# Patient Record
Sex: MALE | Race: WHITE | Employment: FULL TIME | URBAN - NONMETROPOLITAN AREA
[De-identification: names, ages, dates, MRNs, and addresses within clinical notes are randomized per-mention and may not be internally consistent; named-entity substitution may affect disease eponyms.]

---

## 2017-01-30 ENCOUNTER — TELEPHONE (OUTPATIENT)
Dept: PULMONOLOGY | Age: 57
End: 2017-01-30

## 2018-12-27 ENCOUNTER — HOSPITAL ENCOUNTER (OUTPATIENT)
Age: 58
Setting detail: SPECIMEN
Discharge: HOME | End: 2018-12-27
Payer: COMMERCIAL

## 2018-12-27 LAB
ALBUMIN SERPL-MCNC: 3.9 G/DL (ref 3.5–5.2)
ALBUMIN/GLOBULIN RATIO: 1.9 (ref 1–2.5)
ALP BLD-CCNC: 82 U/L (ref 40–129)
ALT SERPL-CCNC: 22 U/L (ref 5–41)
ANION GAP SERPL CALCULATED.3IONS-SCNC: 10 MMOL/L (ref 9–17)
AST SERPL-CCNC: 23 U/L
BILIRUB SERPL-MCNC: 0.35 MG/DL (ref 0.3–1.2)
BUN BLDV-MCNC: 21 MG/DL (ref 6–20)
BUN/CREAT BLD: ABNORMAL (ref 9–20)
CALCIUM SERPL-MCNC: 8.6 MG/DL (ref 8.6–10.4)
CHLORIDE BLD-SCNC: 101 MMOL/L (ref 98–107)
CHOLESTEROL/HDL RATIO: 3.3
CHOLESTEROL: 159 MG/DL
CO2: 26 MMOL/L (ref 20–31)
CREAT SERPL-MCNC: 0.94 MG/DL (ref 0.7–1.2)
GFR AFRICAN AMERICAN: >60 ML/MIN
GFR NON-AFRICAN AMERICAN: >60 ML/MIN
GFR SERPL CREATININE-BSD FRML MDRD: ABNORMAL ML/MIN/{1.73_M2}
GFR SERPL CREATININE-BSD FRML MDRD: ABNORMAL ML/MIN/{1.73_M2}
GLUCOSE BLD-MCNC: 78 MG/DL (ref 70–99)
HDLC SERPL-MCNC: 48 MG/DL
LDL CHOLESTEROL: 98 MG/DL (ref 0–130)
POTASSIUM SERPL-SCNC: 4.8 MMOL/L (ref 3.7–5.3)
SODIUM BLD-SCNC: 137 MMOL/L (ref 135–144)
TOTAL PROTEIN: 6 G/DL (ref 6.4–8.3)
TRIGL SERPL-MCNC: 65 MG/DL
TSH SERPL DL<=0.05 MIU/L-ACNC: 1.96 MIU/L (ref 0.3–5)
VLDLC SERPL CALC-MCNC: NORMAL MG/DL (ref 1–30)

## 2019-01-03 ENCOUNTER — HOSPITAL ENCOUNTER (OUTPATIENT)
Dept: INTERVENTIONAL RADIOLOGY/VASCULAR | Age: 59
Discharge: HOME | End: 2019-01-03
Payer: COMMERCIAL

## 2019-01-03 DIAGNOSIS — I73.9 PAD (PERIPHERAL ARTERY DISEASE) (HCC): ICD-10-CM

## 2019-01-03 PROCEDURE — 93922 UPR/L XTREMITY ART 2 LEVELS: CPT

## 2020-03-16 ENCOUNTER — APPOINTMENT (OUTPATIENT)
Dept: CT IMAGING | Age: 60
End: 2020-03-16

## 2020-03-16 ENCOUNTER — HOSPITAL ENCOUNTER (OUTPATIENT)
Age: 60
Setting detail: OBSERVATION
Discharge: HOME | End: 2020-03-17
Attending: INTERNAL MEDICINE | Admitting: INTERNAL MEDICINE
Payer: COMMERCIAL

## 2020-03-16 ENCOUNTER — APPOINTMENT (OUTPATIENT)
Dept: MRI IMAGING | Age: 60
End: 2020-03-16

## 2020-03-16 ENCOUNTER — APPOINTMENT (OUTPATIENT)
Dept: GENERAL RADIOLOGY | Age: 60
End: 2020-03-16

## 2020-03-16 ENCOUNTER — APPOINTMENT (OUTPATIENT)
Dept: ULTRASOUND IMAGING | Age: 60
End: 2020-03-16

## 2020-03-16 PROBLEM — Z72.0 TOBACCO USE: Status: ACTIVE | Noted: 2020-03-16

## 2020-03-16 PROBLEM — R55 SYNCOPE: Status: ACTIVE | Noted: 2020-03-16

## 2020-03-16 PROBLEM — I95.9 HYPOTENSION: Status: ACTIVE | Noted: 2020-03-16

## 2020-03-16 PROBLEM — E27.8 ADRENAL MASS (HCC): Status: ACTIVE | Noted: 2020-03-16

## 2020-03-16 PROBLEM — K76.9 LIVER LESION: Status: ACTIVE | Noted: 2020-03-16

## 2020-03-16 PROBLEM — R42 DIZZINESS: Status: ACTIVE | Noted: 2020-03-16

## 2020-03-16 PROBLEM — R19.5 POSITIVE OCCULT STOOL BLOOD TEST: Status: ACTIVE | Noted: 2020-03-16

## 2020-03-16 LAB
ABO: NORMAL
ALBUMIN SERPL-MCNC: 3.8 G/DL (ref 3.5–5.1)
ALP BLD-CCNC: 54 U/L (ref 38–126)
ALT SERPL-CCNC: 18 U/L (ref 11–66)
ANION GAP SERPL CALCULATED.3IONS-SCNC: 12 MEQ/L (ref 8–16)
ANTIBODY SCREEN: NORMAL
APTT: 25.3 SECONDS (ref 22–38)
AST SERPL-CCNC: 18 U/L (ref 5–40)
BACTERIA: ABNORMAL /HPF
BASOPHILS # BLD: 0.1 %
BASOPHILS ABSOLUTE: 0 THOU/MM3 (ref 0–0.1)
BILIRUB SERPL-MCNC: 0.4 MG/DL (ref 0.3–1.2)
BILIRUBIN DIRECT: < 0.2 MG/DL (ref 0–0.3)
BILIRUBIN URINE: NEGATIVE
BLOOD, URINE: NEGATIVE
BUN BLDV-MCNC: 49 MG/DL (ref 7–22)
CALCIUM SERPL-MCNC: 8.4 MG/DL (ref 8.5–10.5)
CASTS 2: ABNORMAL /LPF
CASTS UA: ABNORMAL /LPF
CHARACTER, URINE: CLEAR
CHLORIDE BLD-SCNC: 104 MEQ/L (ref 98–111)
CO2: 23 MEQ/L (ref 23–33)
COLOR: YELLOW
CREAT SERPL-MCNC: 0.8 MG/DL (ref 0.4–1.2)
CRYSTALS, UA: ABNORMAL
EKG ATRIAL RATE: 76 BPM
EKG ATRIAL RATE: 97 BPM
EKG P AXIS: 39 DEGREES
EKG P AXIS: 60 DEGREES
EKG P-R INTERVAL: 156 MS
EKG P-R INTERVAL: 168 MS
EKG Q-T INTERVAL: 360 MS
EKG Q-T INTERVAL: 398 MS
EKG QRS DURATION: 78 MS
EKG QRS DURATION: 80 MS
EKG QTC CALCULATION (BAZETT): 447 MS
EKG QTC CALCULATION (BAZETT): 457 MS
EKG R AXIS: 37 DEGREES
EKG R AXIS: 59 DEGREES
EKG T AXIS: 41 DEGREES
EKG T AXIS: 53 DEGREES
EKG VENTRICULAR RATE: 76 BPM
EKG VENTRICULAR RATE: 97 BPM
EOSINOPHIL # BLD: 0.4 %
EOSINOPHILS ABSOLUTE: 0 THOU/MM3 (ref 0–0.4)
EPITHELIAL CELLS, UA: ABNORMAL /HPF
ERYTHROCYTE [DISTWIDTH] IN BLOOD BY AUTOMATED COUNT: 13.2 % (ref 11.5–14.5)
ERYTHROCYTE [DISTWIDTH] IN BLOOD BY AUTOMATED COUNT: 46.6 FL (ref 35–45)
GFR SERPL CREATININE-BSD FRML MDRD: > 90 ML/MIN/1.73M2
GLUCOSE BLD-MCNC: 75 MG/DL (ref 70–108)
GLUCOSE URINE: NEGATIVE MG/DL
HCT VFR BLD CALC: 34 % (ref 42–52)
HCT VFR BLD CALC: 37.9 % (ref 42–52)
HEMOCCULT STL QL: NORMAL
HEMOGLOBIN: 11.5 GM/DL (ref 14–18)
HEMOGLOBIN: 12.7 GM/DL (ref 14–18)
IMMATURE GRANS (ABS): 0.04 THOU/MM3 (ref 0–0.07)
IMMATURE GRANULOCYTES: 0.4 %
INR BLD: 1.05 (ref 0.85–1.13)
KETONES, URINE: ABNORMAL
LEUKOCYTE ESTERASE, URINE: ABNORMAL
LIPASE: 36.8 U/L (ref 5.6–51.3)
LYMPHOCYTES # BLD: 9.8 %
LYMPHOCYTES ABSOLUTE: 1.1 THOU/MM3 (ref 1–4.8)
MAGNESIUM: 2 MG/DL (ref 1.6–2.4)
MCH RBC QN AUTO: 32 PG (ref 26–33)
MCHC RBC AUTO-ENTMCNC: 33.5 GM/DL (ref 32.2–35.5)
MCV RBC AUTO: 95.5 FL (ref 80–94)
MISCELLANEOUS 2: ABNORMAL
MONOCYTES # BLD: 7.5 %
MONOCYTES ABSOLUTE: 0.8 THOU/MM3 (ref 0.4–1.3)
NITRITE, URINE: NEGATIVE
NUCLEATED RED BLOOD CELLS: 0 /100 WBC
OSMOLALITY CALCULATION: 289.2 MOSMOL/KG (ref 275–300)
PH UA: 5 (ref 5–9)
PLATELET # BLD: 177 THOU/MM3 (ref 130–400)
PMV BLD AUTO: 11.7 FL (ref 9.4–12.4)
POTASSIUM SERPL-SCNC: 3.8 MEQ/L (ref 3.5–5.2)
PROTEIN UA: NEGATIVE
RBC # BLD: 3.97 MILL/MM3 (ref 4.7–6.1)
RBC URINE: ABNORMAL /HPF
RENAL EPITHELIAL, UA: ABNORMAL
RH FACTOR: NORMAL
SEG NEUTROPHILS: 81.8 %
SEGMENTED NEUTROPHILS ABSOLUTE COUNT: 8.8 THOU/MM3 (ref 1.8–7.7)
SODIUM BLD-SCNC: 139 MEQ/L (ref 135–145)
SPECIFIC GRAVITY, URINE: > 1.03 (ref 1–1.03)
TOTAL PROTEIN: 5.8 G/DL (ref 6.1–8)
TROPONIN T: < 0.01 NG/ML
UROBILINOGEN, URINE: 0.2 EU/DL (ref 0–1)
WBC # BLD: 10.8 THOU/MM3 (ref 4.8–10.8)
WBC UA: ABNORMAL /HPF
YEAST: ABNORMAL

## 2020-03-16 PROCEDURE — 85025 COMPLETE CBC W/AUTO DIFF WBC: CPT

## 2020-03-16 PROCEDURE — 6360000002 HC RX W HCPCS: Performed by: INTERNAL MEDICINE

## 2020-03-16 PROCEDURE — 96376 TX/PRO/DX INJ SAME DRUG ADON: CPT

## 2020-03-16 PROCEDURE — 85018 HEMOGLOBIN: CPT

## 2020-03-16 PROCEDURE — 93010 ELECTROCARDIOGRAM REPORT: CPT | Performed by: INTERNAL MEDICINE

## 2020-03-16 PROCEDURE — 83735 ASSAY OF MAGNESIUM: CPT

## 2020-03-16 PROCEDURE — 6360000004 HC RX CONTRAST MEDICATION: Performed by: PHYSICIAN ASSISTANT

## 2020-03-16 PROCEDURE — 70551 MRI BRAIN STEM W/O DYE: CPT

## 2020-03-16 PROCEDURE — 99220 PR INITIAL OBSERVATION CARE/DAY 70 MINUTES: CPT | Performed by: INTERNAL MEDICINE

## 2020-03-16 PROCEDURE — 74177 CT ABD & PELVIS W/CONTRAST: CPT

## 2020-03-16 PROCEDURE — 86901 BLOOD TYPING SEROLOGIC RH(D): CPT

## 2020-03-16 PROCEDURE — 85610 PROTHROMBIN TIME: CPT

## 2020-03-16 PROCEDURE — 93005 ELECTROCARDIOGRAM TRACING: CPT | Performed by: INTERNAL MEDICINE

## 2020-03-16 PROCEDURE — 82530 CORTISOL FREE: CPT

## 2020-03-16 PROCEDURE — 96375 TX/PRO/DX INJ NEW DRUG ADDON: CPT

## 2020-03-16 PROCEDURE — 80053 COMPREHEN METABOLIC PANEL: CPT

## 2020-03-16 PROCEDURE — 93005 ELECTROCARDIOGRAM TRACING: CPT | Performed by: PHYSICIAN ASSISTANT

## 2020-03-16 PROCEDURE — 84484 ASSAY OF TROPONIN QUANT: CPT

## 2020-03-16 PROCEDURE — 2580000003 HC RX 258: Performed by: PHYSICIAN ASSISTANT

## 2020-03-16 PROCEDURE — 2580000003 HC RX 258: Performed by: INTERNAL MEDICINE

## 2020-03-16 PROCEDURE — 6360000002 HC RX W HCPCS: Performed by: NURSE PRACTITIONER

## 2020-03-16 PROCEDURE — 99219 PR INITIAL OBSERVATION CARE/DAY 50 MINUTES: CPT | Performed by: PSYCHIATRY & NEUROLOGY

## 2020-03-16 PROCEDURE — G0378 HOSPITAL OBSERVATION PER HR: HCPCS

## 2020-03-16 PROCEDURE — 36415 COLL VENOUS BLD VENIPUNCTURE: CPT

## 2020-03-16 PROCEDURE — 83690 ASSAY OF LIPASE: CPT

## 2020-03-16 PROCEDURE — 85730 THROMBOPLASTIN TIME PARTIAL: CPT

## 2020-03-16 PROCEDURE — 96365 THER/PROPH/DIAG IV INF INIT: CPT

## 2020-03-16 PROCEDURE — 76705 ECHO EXAM OF ABDOMEN: CPT

## 2020-03-16 PROCEDURE — 99284 EMERGENCY DEPT VISIT MOD MDM: CPT

## 2020-03-16 PROCEDURE — 82272 OCCULT BLD FECES 1-3 TESTS: CPT

## 2020-03-16 PROCEDURE — 85014 HEMATOCRIT: CPT

## 2020-03-16 PROCEDURE — 82248 BILIRUBIN DIRECT: CPT

## 2020-03-16 PROCEDURE — 86900 BLOOD TYPING SEROLOGIC ABO: CPT

## 2020-03-16 PROCEDURE — 70450 CT HEAD/BRAIN W/O DYE: CPT

## 2020-03-16 PROCEDURE — 86850 RBC ANTIBODY SCREEN: CPT

## 2020-03-16 PROCEDURE — 81001 URINALYSIS AUTO W/SCOPE: CPT

## 2020-03-16 PROCEDURE — 6360000002 HC RX W HCPCS: Performed by: PHYSICIAN ASSISTANT

## 2020-03-16 PROCEDURE — 94760 N-INVAS EAR/PLS OXIMETRY 1: CPT

## 2020-03-16 PROCEDURE — C9113 INJ PANTOPRAZOLE SODIUM, VIA: HCPCS | Performed by: NURSE PRACTITIONER

## 2020-03-16 PROCEDURE — C9113 INJ PANTOPRAZOLE SODIUM, VIA: HCPCS | Performed by: PHYSICIAN ASSISTANT

## 2020-03-16 RX ORDER — 0.9 % SODIUM CHLORIDE 0.9 %
1000 INTRAVENOUS SOLUTION INTRAVENOUS ONCE
Status: COMPLETED | OUTPATIENT
Start: 2020-03-16 | End: 2020-03-16

## 2020-03-16 RX ORDER — NICOTINE 21 MG/24HR
1 PATCH, TRANSDERMAL 24 HOURS TRANSDERMAL DAILY
Status: DISCONTINUED | OUTPATIENT
Start: 2020-03-16 | End: 2020-03-17 | Stop reason: HOSPADM

## 2020-03-16 RX ORDER — ACETAMINOPHEN 325 MG/1
650 TABLET ORAL EVERY 4 HOURS PRN
Status: DISCONTINUED | OUTPATIENT
Start: 2020-03-16 | End: 2020-03-17 | Stop reason: HOSPADM

## 2020-03-16 RX ORDER — PROMETHAZINE HYDROCHLORIDE 25 MG/1
12.5 TABLET ORAL EVERY 6 HOURS PRN
Status: DISCONTINUED | OUTPATIENT
Start: 2020-03-16 | End: 2020-03-17 | Stop reason: HOSPADM

## 2020-03-16 RX ORDER — FAMOTIDINE 20 MG/1
20 TABLET, FILM COATED ORAL DAILY PRN
Status: ON HOLD | COMMUNITY
End: 2020-03-17 | Stop reason: HOSPADM

## 2020-03-16 RX ORDER — ONDANSETRON 2 MG/ML
4 INJECTION INTRAMUSCULAR; INTRAVENOUS EVERY 6 HOURS PRN
Status: DISCONTINUED | OUTPATIENT
Start: 2020-03-16 | End: 2020-03-17 | Stop reason: HOSPADM

## 2020-03-16 RX ORDER — SODIUM CHLORIDE 9 MG/ML
INJECTION, SOLUTION INTRAVENOUS CONTINUOUS
Status: DISCONTINUED | OUTPATIENT
Start: 2020-03-16 | End: 2020-03-16 | Stop reason: SDUPTHER

## 2020-03-16 RX ORDER — SODIUM CHLORIDE 9 MG/ML
INJECTION, SOLUTION INTRAVENOUS CONTINUOUS
Status: DISCONTINUED | OUTPATIENT
Start: 2020-03-16 | End: 2020-03-17

## 2020-03-16 RX ORDER — PANTOPRAZOLE SODIUM 40 MG/10ML
40 INJECTION, POWDER, LYOPHILIZED, FOR SOLUTION INTRAVENOUS 2 TIMES DAILY
Status: DISCONTINUED | OUTPATIENT
Start: 2020-03-16 | End: 2020-03-17

## 2020-03-16 RX ORDER — 0.9 % SODIUM CHLORIDE 0.9 %
1000 INTRAVENOUS SOLUTION INTRAVENOUS ONCE
Status: DISCONTINUED | OUTPATIENT
Start: 2020-03-16 | End: 2020-03-17

## 2020-03-16 RX ORDER — SODIUM CHLORIDE 0.9 % (FLUSH) 0.9 %
10 SYRINGE (ML) INJECTION PRN
Status: DISCONTINUED | OUTPATIENT
Start: 2020-03-16 | End: 2020-03-17 | Stop reason: HOSPADM

## 2020-03-16 RX ORDER — SODIUM CHLORIDE 0.9 % (FLUSH) 0.9 %
10 SYRINGE (ML) INJECTION EVERY 12 HOURS SCHEDULED
Status: DISCONTINUED | OUTPATIENT
Start: 2020-03-16 | End: 2020-03-17 | Stop reason: HOSPADM

## 2020-03-16 RX ADMIN — IOPAMIDOL 80 ML: 755 INJECTION, SOLUTION INTRAVENOUS at 11:41

## 2020-03-16 RX ADMIN — SODIUM CHLORIDE: 9 INJECTION, SOLUTION INTRAVENOUS at 11:59

## 2020-03-16 RX ADMIN — PANTOPRAZOLE SODIUM 40 MG: 40 INJECTION, POWDER, FOR SOLUTION INTRAVENOUS at 21:42

## 2020-03-16 RX ADMIN — SODIUM CHLORIDE: 9 INJECTION, SOLUTION INTRAVENOUS at 20:04

## 2020-03-16 RX ADMIN — SODIUM CHLORIDE 1000 ML: 9 INJECTION, SOLUTION INTRAVENOUS at 10:30

## 2020-03-16 RX ADMIN — SODIUM CHLORIDE 80 MG: 9 INJECTION, SOLUTION INTRAVENOUS at 11:23

## 2020-03-16 RX ADMIN — HYDROCORTISONE SODIUM SUCCINATE 100 MG: 100 INJECTION, POWDER, FOR SOLUTION INTRAMUSCULAR; INTRAVENOUS at 21:42

## 2020-03-16 RX ADMIN — SODIUM CHLORIDE 8 MG/HR: 9 INJECTION, SOLUTION INTRAVENOUS at 12:00

## 2020-03-16 ASSESSMENT — PAIN SCALES - GENERAL
PAINLEVEL_OUTOF10: 0
PAINLEVEL_OUTOF10: 0

## 2020-03-16 ASSESSMENT — ENCOUNTER SYMPTOMS
CONSTIPATION: 1
DIARRHEA: 0
NAUSEA: 0
SHORTNESS OF BREATH: 0
VOMITING: 0
BACK PAIN: 0
BLOOD IN STOOL: 0
SORE THROAT: 0
COUGH: 0
RHINORRHEA: 0
ABDOMINAL PAIN: 1
COLOR CHANGE: 0

## 2020-03-16 NOTE — ED PROVIDER NOTES
[address] [facility] [address] [address] EMERGENCY DEPT      CHIEF COMPLAINT       Chief Complaint   Patient presents with    Loss of Consciousness     passed out     Fatigue       Nurses Notes reviewed and I agree except asnoted in the HPI. HISTORY OFPRESENT ILLNESS    Kyle Crandall is a 60 y.o. male who presents to the emergency department for evaluation of abdominal pain, dark stool, and syncope. The patient states that for the past several days, his stool has been a very dark brown color. The patient denies noting any gross blood in his stool. The patient reports having abdominal pain and discomfort, although he currently denies any abdominal pain. The patient states that over the past several days, he has began to feel weak and dizzy. The patient states that he felt so weak and dizzy last night that he thought he was going to pass out, although he denies any syncope last night. However, the patient states that while in the kitchen this morning, he did feel dizzy and weak again. He states that he did pass out this morning and fell to the floor. The patient denies any head injury at this time. He states that he was unconscious for less than 1 minute before regaining consciousness. The patient currently denies any headaches, dizziness, or lightheadedness. The patient denies having any recent fevers, chills, chest pain, shortness of breath, coughing, URI symptoms, nausea, or vomiting. The patient reports that he has not been eating as much lately and thinks that he is constipated due to his decreased food intake. The patient has no documented history of heart disease and has no significant past medical history other than hypertension. The patient is not anticoagulated but does take a baby aspirin daily. There are no additional complaints at this time. REVIEW OF SYSTEMS      Review of Systems   Constitutional: Positive for appetite change (decreased) and fatigue. Negative for chills and fever.    HENT: Negative for congestion, ear pain, rhinorrhea and sore throat. Respiratory: Negative for cough and shortness of breath. Cardiovascular: Negative for chest pain and palpitations. Gastrointestinal: Positive for abdominal pain and constipation. Negative for blood in stool, diarrhea, nausea and vomiting. Dark brown stool   Genitourinary: Negative for decreased urine volume, difficulty urinating and dysuria. Musculoskeletal: Negative for arthralgias, back pain, myalgias and neck pain. Skin: Negative for color change and pallor. Neurological: Positive for dizziness, syncope and weakness (generalized). Negative for light-headedness, numbness and headaches. Hematological: Does not bruise/bleed easily. PAST MEDICAL HISTORY    has a past medical history of Hypertension. SURGICAL HISTORY      has no past surgical history on file. CURRENT MEDICATIONS       Previous Medications    ASPIRIN 81 MG TABLET    Take 81 mg by mouth daily    LISINOPRIL-HYDROCHLOROTHIAZIDE (PRINZIDE;ZESTORETIC) 10-12.5 MG PER TABLET    Take 1 tablet by mouth daily. ALLERGIES     has No Known Allergies. FAMILY HISTORY     He indicated that his mother is alive. He indicated that his father is . [facility]    SOCIAL HISTORY      reports that he has been smoking cigarettes. He has a 30.00 pack-year smoking history. He has never used smokeless tobacco. He reports current alcohol use. He reports that he does not use drugs. PHYSICAL EXAM     INITIAL VITALS:  height is 5' 9\" (1.753 m) and weight is 260 lb (117.9 kg). His oral temperature is 97.9 °F (36.6 °C). His blood pressure is 125/58 (abnormal) and his pulse is 80. His respiration is 18 and oxygen saturation is 100%. Physical Exam  Vitals signs and nursing note reviewed. Exam conducted with a chaperone present. Constitutional:       General: He is not in acute distress. Appearance: Normal appearance. He is well-developed.  He is not ill-appearing, GCS: GCS eye subscore is 4. GCS verbal subscore is 5. GCS motor subscore is 6. Cranial Nerves: No cranial nerve deficit. Sensory: No sensory deficit. Motor: No weakness or abnormal muscle tone. Coordination: Coordination normal.      Gait: Gait normal.      Comments: There were no noted cranial nerve or focal neurologic deficits. Cranial nerves II through XII are grossly intact. Psychiatric:         Behavior: Behavior normal.         Thought Content: Thought content normal.               DIFFERENTIAL DIAGNOSIS:   Includes but not limited to: GI bleed, gastritis, gastroenteritis, colitis, diverticulitis, symptomatic anemia, dehydration    DIAGNOSTIC RESULTS     EKG: All EKG's are interpreted by the Emergency Department Physician who either signs or Co-signsthis chart in the absence of a cardiologist.  Padmini Dodson. Rate: 97 bpm  MI interval: 156 ms  QRS duration: 80 ms  QTc: 360/457 ms  P-R-T axes: 60, 59, 53  Normal sinus rhythm. Low voltage QRS. Borderline EKG. No STEMI. RADIOLOGY: non-plain film images(s) such as CT, Ultrasound and MRI are read by the radiologist.  Plainradiographic images are visualized and preliminarily interpreted by the emergency physician unless otherwise stated below. CT ABDOMEN PELVIS W IV CONTRAST Additional Contrast? Radiologist Recommendation   Final Result   1. There is a low-density lesion incidentally noted within the right hepatic lobe measuring 1.3 x 0.7 cm. This is incompletely characterized. There are additional smaller subcentimeter low-density lesions demonstrated within the right hepatic lobe on    axial image 21 and 26. However, these lesions are too small adequately characterize. Further evaluation could be obtained with nonemergent liver lesion protocol CT imaging with and without contrast.       2. 1.3 cm left adrenal gland nodule is incompletely characterized. Further evaluation could be obtained with adrenal protocol CT or MR imaging.       3. Mild nonspecific thickening of the posterior wall of the rectum. This could be related to incomplete distention. Correlate clinically. **This report has been created using voice recognition software. It may contain minor errors which are inherent in voice recognition technology. **      Final report electronically signed by Dr. Kuldip Arenas on 3/16/2020 12:13 PM      CT Head WO Contrast   Final Result   1. Patchy subcortical white matter hypoattenuation is demonstrated within the bilateral frontal lobe regions, left basal ganglia, right temporal parietal region and left paraventricular temporoparietal region. These areas of hypoattenuation may be    related to chronic small vessel ischemic changes. However, if there is concern for acute ischemia, further evaluation could be obtained with MRI. 2. No acute intracranial hemorrhage or mass effect. **This report has been created using voice recognition software. It may contain minor errors which are inherent in voice recognition technology. **      Final report electronically signed by Dr. Kuldip Arenas on 3/16/2020 12:02 PM          LABS:   Labs Reviewed   CBC WITH AUTO DIFFERENTIAL - Abnormal; Notable for the following components:       Result Value    RBC 3.97 (*)     Hemoglobin 12.7 (*)     Hematocrit 37.9 (*)     MCV 95.5 (*)     RDW-SD 46.6 (*)     Segs Absolute 8.8 (*)     All other components within normal limits   BASIC METABOLIC PANEL - Abnormal; Notable for the following components:    BUN 49 (*)     Calcium 8.4 (*)     All other components within normal limits   HEPATIC FUNCTION PANEL - Abnormal; Notable for the following components:     Total Protein 5.8 (*)     All other components within normal limits   URINE WITH REFLEXED MICRO - Abnormal; Notable for the following components:    Ketones, Urine TRACE (*)     Specific Gravity, Urine > 1.030 (*)     Leukocyte Esterase, Urine TRACE (*)     All other components within normal limits   APTT PROTIME-INR   LIPASE   TROPONIN   MAGNESIUM   BLOOD OCCULT STOOL SCREEN #1   ANION GAP   GLOMERULAR FILTRATION RATE, ESTIMATED   OSMOLALITY   TYPE AND SCREEN       EMERGENCY DEPARTMENT COURSE:   Vitals:    Vitals:    03/16/20 1047 03/16/20 1122 03/16/20 1151 03/16/20 1159   BP: (!) 96/48 (!) 113/51 (!) 125/58    Pulse:  89  80   Resp:  20  18   Temp:       TempSrc:       SpO2:  100%  100%   Weight:       Height:         The patient was seen and evaluated within the ED today with abdominal pain, dark stool, syncope, and generalized weakness. Within the department, I observed the patient's vital signs to be within acceptable range, and he was afebrile. On exam, I appreciated clear lung sounds. There is no abdominal tenderness, guarding, rigidity, rebound tenderness, or distention. There is no gross blood on the glove of the exam finger following the rectal exam, although there is very dark brown stool. There were no noted cranial nerve or focal neurologic deficits. Cranial nerves II through XII are grossly intact. Radiologic studies within the department revealed no acute hemorrhage, mass, or traumatic pathology. CT of the abdomen revealed a low-density lesion incidentally noted within the right hepatic lobe measuring 1.3 x 0.7 cm. This is incompletely characterized. There are additional smaller subcentimeter low-density lesions demonstrated within the right hepatic lobe. Mild nonspecific thickening of the posterior wall of the rectum. This could be related to incomplete distention. Laboratory work revealed a hemoglobin of 12.7, hematocrit 37.9. Stool hemoccult is microscopically positive. Troponin is negative. Within the department, the patient was treated with IV saline and an IV Protonix bolus followed by an IV Protonix infusion. The patient did become hypotensive during his stay with a blood pressure approximately in the 87M systolic and upper 63E diastolic.   The patient reported

## 2020-03-16 NOTE — CONSULTS
Consult History & Physical      Patient:  Reshma Jones  YOB: 1960  MRN: [id number]     Acct: [de-identified]    Chief Complaint:    Chief Complaint   Patient presents with    Loss of Consciousness     passed out     Fatigue       Date of Service: Pt seen/examined in consultation on 3/16/2020    History Of Present Illness:      60 y.o. male who we are asked to see/evaluate by Marshall Wilson DO for medical management of GI bleed and + FOBT. He came to the ED for dark stools and generalized weakness. He had a syncopal episode, but denies hitting his head. His Hgb was noted to be 12.7. FOBT positive. CT A/P demonstrated possible low-density lesions in the liver, 1.3 cm left adrenal gland nodule, and mild nonspecific thickening of the posterior wall of the rectum. He denies abdominal pain, nausea, and vomiting. He says he had loose stools yesterday, but normally does not have trouble. He states his stools have been dark brown. He is on low dose aspirin daily. He occasionally takes NSAIDs. Denies a family history of colon cancer. He has never had an EGD or colonoscopy. He is wanting to go home. Past Medical History:    Past Medical History:   Diagnosis Date    Hypertension        Home Medications:  Prior to Admission medications    Medication Sig Start Date End Date Taking? Authorizing Provider   aspirin 81 MG tablet Take 81 mg by mouth daily   Yes Historical Provider, MD   famotidine (PEPCID) 20 MG tablet Take 20 mg by mouth daily as needed OTC   Yes Historical Provider, MD   lisinopril-hydrochlorothiazide (PRINZIDE;ZESTORETIC) 10-12.5 MG per tablet Take 1 tablet by mouth daily. 9/17/13  Yes Isha Bautista MD       Surgical History:  History reviewed. No pertinent surgical history.     Family History:  Family History   Problem Relation Age of Onset    High Blood Pressure Mother     Thyroid Disease Mother     Cancer Mother        Past GI History:  none    Allergies:  Patient has no known allergies. Social History:   TOBACCO:   reports that he has been smoking cigarettes. He has a 30.00 pack-year smoking history. He has never used smokeless tobacco.  ETOH:   reports current alcohol use. Review Of Systems  GENERAL: No fever, chills or weight loss. EYES:  No  blurred vision, double vision   CARDIOVASCULAR: No chest pain or palpitations. RESPIRATORY:  No dyspnea or cough. GI:  See HPI  MUSCULOSKELETAL: No new painful or swollen joints or myalgias. :   No dysuria or hematuria. SKIN:  No rashes or jaundice. NEUROLOGIC:  No headaches or seizures, numbness or tingling of arms, or legs. PSYCH:  No anxiety or depression. ENDOCRINE:  No polyuria or polydipsia. BLOOD:  +anemia    PHYSICAL EXAM:  /63   Pulse 91   Temp 98.1 °F (36.7 °C) (Oral)   Resp 18   Ht 5' 9\" (1.753 m)   Wt 260 lb (117.9 kg)   SpO2 100% Comment: no o2 needed at this time  BMI 38.40 kg/m²     General appearance: No apparent distress, appears stated age and cooperative. HEENT: Normal cephalic, atraumatic without obvious deformity. Pupils equal, round, and reactive to light. Neck: Supple, with full range of motion. No jugular venous distention. Trachea midline. Respiratory:  Normal respiratory effort. Clear to auscultation, bilaterally without Rales/Wheezes/Rhonchi. Cardiovascular: Regular rate and rhythm without murmurs, rubs or gallops. Abdomen: Soft, non-tender, non-distended with active bowel sounds. Musculoskeletal: No clubbing, cyanosis or edema bilaterally. Skin: Pink, warm, dry. No rashes or lesions.   Psychiatric: Alert and oriented, thought content appropriate, normal insight    Labs:   Recent Labs     03/16/20  1023   WBC 10.8   HGB 12.7*   HCT 37.9*        Recent Labs     03/16/20  1023      K 3.8      CO2 23   BUN 49*   CREATININE 0.8   CALCIUM 8.4*     Recent Labs     03/16/20  1023   AST 18   ALT 18   BILIDIR <0.2   BILITOT 0.4   ALKPHOS 54     Recent Labs 03/16/20  1023   INR 1.05       Radiology:   CT abdomen/pelvis 03/16/20  Impression   1. There is a low-density lesion incidentally noted within the right hepatic lobe measuring 1.3 x 0.7 cm. This is incompletely characterized. There are additional smaller subcentimeter low-density lesions demonstrated within the right hepatic lobe on    axial image 21 and 26. However, these lesions are too small adequately characterize. Further evaluation could be obtained with nonemergent liver lesion protocol CT imaging with and without contrast.        2. 1.3 cm left adrenal gland nodule is incompletely characterized. Further evaluation could be obtained with adrenal protocol CT or MR imaging.       3. Mild nonspecific thickening of the posterior wall of the rectum. This could be related to incomplete distention. Correlate clinically. CT head 03/16/20      Impression   1. Patchy subcortical white matter hypoattenuation is demonstrated within the bilateral frontal lobe regions, left basal ganglia, right temporal parietal region and left paraventricular temporoparietal region. These areas of hypoattenuation may be    related to chronic small vessel ischemic changes. However, if there is concern for acute ischemia, further evaluation could be obtained with MRI.       2. No acute intracranial hemorrhage or mass effect. Code Status: Full Code    ASSESSMENT:  1. Anemia  2. Syncopal episode  3. Liver lesion- on imaging, very small will continue to monitor outpatient  4. H/O HTN    PLAN:    1. Monitor H & H, transfuse prn  2. IVP PPI BID  3. Clear liquid diet, nothing red  4. Will need EGD & colonoscopy inpatient vs outpatient  5. US liver per primary  6. Neurology & cardiology consulted per primary  7.  Supportive care per primary team  Will follow       Case reviewed and impression/plan reviewed in collaboration with Dr. Pardeep Miranda  Electronically signed by VINH Gonzalez - CNP on 3/16/2020 at 3:25 PM    [facility]  Thank you for the consultation.

## 2020-03-16 NOTE — H&P
History & Physical        Patient:  Danika Thomas  YOB: 1960    MRN: [id number]     Acct: [de-identified]    PCP: VINH Guidry CNP    Date of Admission: 3/16/2020    Date of Service: Pt seen/examined on 3/16/2020   and Admitted to Observation with expected LOS less than two midnights due to medical therapy. Chief Complaint:   Chief Complaint   Patient presents with    Loss of Consciousness     passed out     Fatigue       History Of Present Illness:     60 y.o. male who presented to [facility] with complaints of dark stools x several days. He reports that he has been feeling weak and dizzy, starting last night. He reports he thought he was going to pass out. Had some abdominal pain which has now resolved. He notes that he woke up this morning and had loss of consciousness this AM while in kitchen. Patient fell to the floor. He states he was unconscious for approximately a minute. He states he is feeling fine now. He states he is currently asymptomatic. Denies chest pain or shortness of breath. Denies nausea, vomiting, constipation. Denies abdominal pain today. Does not endorse urinary complaints. Does not endorse URI symptoms. PMH HTN    While in ED patient was found to be hypotensive BP 83/72. .  Patient given IV fluid hydration. Patient placed on Protonix infusion. Hgb stable 12.7. Stool positive for blood in ED. GI consulted while in ED. CT report noting some concerns and encouraging MRI brain. CT abdomen and pelvis noting liver lesion and adrenal mass. Patient to be admitted at this time for syncope. Past Medical History:          Diagnosis Date    Hypertension        Past Surgical History:      History reviewed. No pertinent surgical history. Medications Prior to Admission:      Prior to Admission medications    Medication Sig Start Date End Date Taking?  Authorizing Provider   aspirin 81 MG tablet Take 81 mg by mouth daily   Yes Historical Provider, MD   famotidine (PEPCID) 20 MG tablet Take 20 mg by mouth daily as needed OTC   Yes Historical Provider, MD   lisinopril-hydrochlorothiazide (PRINZIDE;ZESTORETIC) 10-12.5 MG per tablet Take 1 tablet by mouth daily. 9/17/13  Yes Kwabena Hill MD       Allergies:  Patient has no known allergies. Social History:      The patient currently lives at home    TOBACCO:   reports that he has been smoking cigarettes. He has a 30.00 pack-year smoking history. He has never used smokeless tobacco.  ETOH:   reports current alcohol use. Family History:      Reviewed in detail and negative for DM, CAD, CVA. Positive as follows:        Problem Relation Age of Onset    High Blood Pressure Mother     Thyroid Disease Mother     Cancer Mother        Diet:  Clear liquids    REVIEW OF SYSTEMS:   Pertinent positives as noted in the HPI. All other systems reviewed and negative. PHYSICAL EXAM:    BP (!) 125/58   Pulse 80   Temp 97.9 °F (36.6 °C) (Oral)   Resp 18   Ht 5' 9\" (1.753 m)   Wt 260 lb (117.9 kg)   SpO2 100%   BMI 38.40 kg/m²     General appearance:  No apparent distress, appears stated age and cooperative. HEENT:  Normal cephalic, atraumatic without obvious deformity. Pupils equal, round, and reactive to light. Extra ocular muscles intact. Conjunctivae/corneas clear. Neck: Supple, with full range of motion. No jugular venous distention. Trachea midline. Respiratory:  Normal respiratory effort. Clear to auscultation, bilaterally without Rales/Wheezes/Rhonchi. Cardiovascular:  Regular rate and rhythm with normal S1/S2 without murmurs, rubs or gallops. Abdomen: Soft, non-tender, non-distended with normal bowel sounds. Musculoskeletal:  No clubbing, cyanosis or edema bilaterally. Full range of motion without deformity. Skin: Skin color, texture, turgor normal.  No rashes or lesions. Neurologic:  Neurovascularly intact without any focal sensory/motor deficits.  Cranial nerves: II-XII intact, Contrast   Final Result   1. Patchy subcortical white matter hypoattenuation is demonstrated within the bilateral frontal lobe regions, left basal ganglia, right temporal parietal region and left paraventricular temporoparietal region. These areas of hypoattenuation may be    related to chronic small vessel ischemic changes. However, if there is concern for acute ischemia, further evaluation could be obtained with MRI. 2. No acute intracranial hemorrhage or mass effect. **This report has been created using voice recognition software. It may contain minor errors which are inherent in voice recognition technology. **      Final report electronically signed by Dr. Kuldip Arenas on 3/16/2020 12:02 PM               DVT prophylaxis: [] Lovenox                                 [x] SCDs                                 [] SQ Heparin                                 [] Encourage ambulation           [] Already on Anticoagulation    Code Status: Full Code    PT/OT Eval Status: Encouraged, if warranted    Disposition:    [x] Home       [] TCU       [] Rehab       [] Psych       [] SNF       [] [place]       [] Other-    ASSESSMENT:    Active Hospital Problems    Diagnosis Date Noted    Syncope [R55] 03/16/2020     Priority: High    Dizziness [R42] 03/16/2020     Priority: Medium    Positive occult stool blood test [R19.5] 03/16/2020     Priority: Medium    Hypotension [I95.9] 03/16/2020     Priority: Medium    Liver lesion [K76.9] 03/16/2020     Priority: Low    Adrenal mass ([facility] [address] [E27.8] 03/16/2020     Priority: Low    Tobacco use [Z72.0] 03/16/2020     Priority: Low       PLAN:    1. Admit to Telemetry Unit  2. Diet  clear liquid diet  3. IVF hydration as tolerated  4. Analgesics PRN  5. Antiemetics PRN  6. DVT prophylaxis  7. PT/OT encouraged, if warranted  8. Incentive spirometry  9. Troponin trend  10. EKG in AM  11. GI consult, appreciate chart recs  12.  H/H every 6 hours trend

## 2020-03-16 NOTE — CONSULTS
NEUROLOGY CONSULT NOTE      Requesting Physician: Alejandro Mccarthy DO    Reason for Consult:  Evaluate for syncope and collapse. History of Present Illness:  Sol Page is a 60 y.o. male admitted to [facility] on 3/16/2020. He  presents to the emergency department for evaluation of abdominal pain, dark stool, and syncope. The patient states that for the past several days, his stool has been a very dark brown color. The patient denies noting any gross blood in his stool. The patient reports having abdominal pain and discomfort, although he currently denies any abdominal pain. The patient states that over the past several days, he has began to feel weak and dizzy. Symptoms are moderate and constant. Onset is progressive. Modifiers are he has been having dark stools. Frequency od symptoms have been ongoing for the past few days. Duration of symptoms is ongoing. The patient states that he felt so weak and dizzy last night that he thought he was going to pass out, although he denies any syncope last night. However, the patient states that while in the kitchen this morning, he did feel dizzy and weak again. He states that he did pass out this morning and fell to the floor. The patient denies any head injury at this time. He states that he was unconscious for less than 1 minute before regaining consciousness. The patient currently denies any headaches, dizziness, or lightheadedness. The patient denies having any recent fevers, chills, chest pain, shortness of breath, coughing, URI symptoms, nausea, or vomiting. The patient reports that he has not been eating as much lately and thinks that he is constipated due to his decreased food intake. The patient has no documented history of heart disease and has no significant past medical history other than hypertension. The patient is not anticoagulated but does take a baby aspirin daily. Reports no chest pain.  No shortness of breath person, place, time  Memory: normal  Fund of Knowledge: normal  Attention/Concentration: normal  Language: normal. Mood is normal  Neck - supple, no neck adenopathy, carotids upstroke normal bilaterally, no carotid bruits. There is no LAP in the neck. There is no thyroid enlargement. Neurological -   Cranial [id number]:   Cranial nerve II: Normal. There is full visual fields  Cranial nerve III: Pupils: equal, round, reactive to light   Cranial nerves III, IV, VI: Extraocular Movements: intact   Cranial nerve V: Facial sensation: intact   Cranial nerve VII:Facial strength: intact   Cranial nerve VIII: Hearing: intact   Cranial nerve IX: Palate Elevation intact bilaterally  Cranial nerve XI: Shoulder shrug intact bilaterally  Cranial nerve XII: Tongue midline   neck supple without rigidity  DTR's are decreased distal and symmetric  Babinski sign is negative on bilaterally. Motor exam is 5/5 in the upper and lower extremities. Normal muscle tone. There is no muscle atrophy. There is no muscle fasciculation    Sensory is intact forlight touch . Coordination: finger to nose intact  Gait and station not tested  Abnormal movement none. Long tracts are  deferred. Skin - no rashes or lesions, warm and dry to touch  Superficial temporal artery pulses are normal.   Musculoskeletal: Has no hand arthritis, no limitation of ROM in any of the four extremities. no joint tenderness, deformity or swelling. There is no leg edema. The Heart was regular in rate and rhythm. No heart murmur  Chest- Clear, good effort. Abdomen: soft, intact bowel sounds.         Labs:    CBC:   Recent Labs     03/16/20  1023   WBC 10.8   HGB 12.7*      MCV 95.5*   MCH 32.0   MCHC 33.5     CMP:  Recent Labs     03/16/20  1023      K 3.8      CO2 23   BUN 49*   CREATININE 0.8   LABGLOM >90   GLUCOSE 75   CALCIUM 8.4*     Liver:   Recent Labs     03/16/20  1023   AST 18   ALT 18   ALKPHOS 54   PROT 5.8*   LABALBU 3.8 BILITOT 0.4      I reviewed the CT brain and agree with interpretation. Results for orders placed during the hospital encounter of 03/16/20   CT Head WO Contrast    Narrative PROCEDURE: CT HEAD WO CONTRAST    CLINICAL INFORMATION: syncope, Trauma     COMPARISON: No prior study. TECHNIQUE:  Axial CT images were obtained through the head without contrast. Coronal and sagittal reformatted images were rendered. All CT scans at this facility use dose modulation, iterative reconstruction, and/or weight-based dosing when appropriate   to reduce radiation dose to as low as reasonably achievable. FINDINGS:     No acute cranial hemorrhage is demonstrated. There is no midline shift. Patchy subcortical white matter hypoattenuation is demonstrated within the bilateral frontal lobe regions, left basal ganglia, right temporal parietal region and left paraventricular temporoparietal region. These areas of hypoattenuation may be related   to chronic small vessel ischemic changes. However, if there is concern for acute ischemia, further evaluation could be obtained with MRI. The cerebral sulci and lateral ventricles appear normal in size and configuration. The basal cisterns and posterior fossa appear normal. No acute abdomen bodies of the calvarium are seen. There is moderate left and mild right mucosal thickening within   the maxillary sinuses. The globes and orbits appear grossly intact. Impression 1. Patchy subcortical white matter hypoattenuation is demonstrated within the bilateral frontal lobe regions, left basal ganglia, right temporal parietal region and left paraventricular temporoparietal region. These areas of hypoattenuation may be   related to chronic small vessel ischemic changes. However, if there is concern for acute ischemia, further evaluation could be obtained with MRI. 2. No acute intracranial hemorrhage or mass effect. **This report has been created using voice recognition software.

## 2020-03-16 NOTE — ED TRIAGE NOTES
Patient arrived to room 42 with c/o LOC, weakness. Patient stated he passed out for not even 1 minute this morning, stated he was checked out by EMS and advised to come here. Patient stated he is also having ABD pain and dark colored stool. Patient stated he only takes Asprin and HTN medication.

## 2020-03-17 ENCOUNTER — HOSPITAL ENCOUNTER (OUTPATIENT)
Dept: CT IMAGING | Age: 60
Setting detail: OBSERVATION
Discharge: HOME | End: 2020-03-17

## 2020-03-17 ENCOUNTER — APPOINTMENT (OUTPATIENT)
Dept: CT IMAGING | Age: 60
End: 2020-03-17

## 2020-03-17 ENCOUNTER — APPOINTMENT (OUTPATIENT)
Dept: INTERVENTIONAL RADIOLOGY/VASCULAR | Age: 60
End: 2020-03-17

## 2020-03-17 VITALS
OXYGEN SATURATION: 100 % | BODY MASS INDEX: 37.93 KG/M2 | TEMPERATURE: 98.2 F | HEIGHT: 69 IN | SYSTOLIC BLOOD PRESSURE: 115 MMHG | RESPIRATION RATE: 16 BRPM | DIASTOLIC BLOOD PRESSURE: 58 MMHG | WEIGHT: 256.1 LBS | HEART RATE: 76 BPM

## 2020-03-17 LAB
ANION GAP SERPL CALCULATED.3IONS-SCNC: 9 MEQ/L (ref 8–16)
BUN BLDV-MCNC: 30 MG/DL (ref 7–22)
CALCIUM SERPL-MCNC: 7.7 MG/DL (ref 8.5–10.5)
CHLORIDE BLD-SCNC: 104 MEQ/L (ref 98–111)
CO2: 20 MEQ/L (ref 23–33)
CREAT SERPL-MCNC: 0.9 MG/DL (ref 0.4–1.2)
EKG ATRIAL RATE: 68 BPM
EKG P AXIS: 65 DEGREES
EKG P-R INTERVAL: 188 MS
EKG Q-T INTERVAL: 410 MS
EKG QRS DURATION: 78 MS
EKG QTC CALCULATION (BAZETT): 435 MS
EKG R AXIS: 64 DEGREES
EKG T AXIS: 63 DEGREES
EKG VENTRICULAR RATE: 68 BPM
ERYTHROCYTE [DISTWIDTH] IN BLOOD BY AUTOMATED COUNT: 13.2 % (ref 11.5–14.5)
ERYTHROCYTE [DISTWIDTH] IN BLOOD BY AUTOMATED COUNT: 46 FL (ref 35–45)
GFR SERPL CREATININE-BSD FRML MDRD: 86 ML/MIN/1.73M2
GLUCOSE BLD-MCNC: 117 MG/DL (ref 70–108)
HCT VFR BLD CALC: 28.9 % (ref 42–52)
HCT VFR BLD CALC: 28.9 % (ref 42–52)
HCT VFR BLD CALC: 29.1 % (ref 42–52)
HCT VFR BLD CALC: 30.4 % (ref 42–52)
HEMOGLOBIN: 10.2 GM/DL (ref 14–18)
HEMOGLOBIN: 9.6 GM/DL (ref 14–18)
HEMOGLOBIN: 9.6 GM/DL (ref 14–18)
HEMOGLOBIN: 9.7 GM/DL (ref 14–18)
INR BLD: 1.14 (ref 0.85–1.13)
LV EF: 60 %
LVEF MODALITY: NORMAL
MCH RBC QN AUTO: 31.8 PG (ref 26–33)
MCHC RBC AUTO-ENTMCNC: 33.2 GM/DL (ref 32.2–35.5)
MCV RBC AUTO: 95.7 FL (ref 80–94)
PLATELET # BLD: 139 THOU/MM3 (ref 130–400)
PMV BLD AUTO: 11.8 FL (ref 9.4–12.4)
POTASSIUM SERPL-SCNC: 4.2 MEQ/L (ref 3.5–5.2)
RBC # BLD: 3.02 MILL/MM3 (ref 4.7–6.1)
SODIUM BLD-SCNC: 133 MEQ/L (ref 135–145)
WBC # BLD: 7.3 THOU/MM3 (ref 4.8–10.8)

## 2020-03-17 PROCEDURE — G0297 LDCT FOR LUNG CA SCREEN: HCPCS

## 2020-03-17 PROCEDURE — 85610 PROTHROMBIN TIME: CPT

## 2020-03-17 PROCEDURE — 93306 TTE W/DOPPLER COMPLETE: CPT

## 2020-03-17 PROCEDURE — 99217 PR OBSERVATION CARE DISCHARGE MANAGEMENT: CPT | Performed by: FAMILY MEDICINE

## 2020-03-17 PROCEDURE — 93880 EXTRACRANIAL BILAT STUDY: CPT

## 2020-03-17 PROCEDURE — 84244 ASSAY OF RENIN: CPT

## 2020-03-17 PROCEDURE — 93005 ELECTROCARDIOGRAM TRACING: CPT | Performed by: INTERNAL MEDICINE

## 2020-03-17 PROCEDURE — 85027 COMPLETE CBC AUTOMATED: CPT

## 2020-03-17 PROCEDURE — 85014 HEMATOCRIT: CPT

## 2020-03-17 PROCEDURE — 6370000000 HC RX 637 (ALT 250 FOR IP): Performed by: FAMILY MEDICINE

## 2020-03-17 PROCEDURE — 80048 BASIC METABOLIC PNL TOTAL CA: CPT

## 2020-03-17 PROCEDURE — G0378 HOSPITAL OBSERVATION PER HR: HCPCS

## 2020-03-17 PROCEDURE — 83835 ASSAY OF METANEPHRINES: CPT

## 2020-03-17 PROCEDURE — 82088 ASSAY OF ALDOSTERONE: CPT

## 2020-03-17 PROCEDURE — 36415 COLL VENOUS BLD VENIPUNCTURE: CPT

## 2020-03-17 PROCEDURE — 6360000004 HC RX CONTRAST MEDICATION: Performed by: INTERNAL MEDICINE

## 2020-03-17 PROCEDURE — 74170 CT ABD WO CNTRST FLWD CNTRST: CPT

## 2020-03-17 PROCEDURE — 6370000000 HC RX 637 (ALT 250 FOR IP): Performed by: NURSE PRACTITIONER

## 2020-03-17 PROCEDURE — 99243 OFF/OP CNSLTJ NEW/EST LOW 30: CPT | Performed by: INTERNAL MEDICINE

## 2020-03-17 PROCEDURE — 93010 ELECTROCARDIOGRAM REPORT: CPT | Performed by: INTERNAL MEDICINE

## 2020-03-17 PROCEDURE — 85018 HEMOGLOBIN: CPT

## 2020-03-17 RX ORDER — SUCRALFATE 1 G/1
1 TABLET ORAL 4 TIMES DAILY
Qty: 120 TABLET | Refills: 0 | Status: SHIPPED | OUTPATIENT
Start: 2020-03-17

## 2020-03-17 RX ORDER — SUCRALFATE 1 G/1
1 TABLET ORAL EVERY 6 HOURS SCHEDULED
Status: DISCONTINUED | OUTPATIENT
Start: 2020-03-17 | End: 2020-03-17 | Stop reason: HOSPADM

## 2020-03-17 RX ORDER — PANTOPRAZOLE SODIUM 40 MG/1
40 TABLET, DELAYED RELEASE ORAL
Qty: 30 TABLET | Refills: 1 | Status: SHIPPED | OUTPATIENT
Start: 2020-03-18

## 2020-03-17 RX ORDER — PANTOPRAZOLE SODIUM 40 MG/1
40 TABLET, DELAYED RELEASE ORAL
Status: DISCONTINUED | OUTPATIENT
Start: 2020-03-17 | End: 2020-03-17 | Stop reason: HOSPADM

## 2020-03-17 RX ADMIN — IOPAMIDOL 80 ML: 755 INJECTION, SOLUTION INTRAVENOUS at 08:43

## 2020-03-17 RX ADMIN — SUCRALFATE 1 G: 1 TABLET ORAL at 12:25

## 2020-03-17 RX ADMIN — PANTOPRAZOLE SODIUM 40 MG: 40 TABLET, DELAYED RELEASE ORAL at 12:25

## 2020-03-17 ASSESSMENT — PAIN SCALES - GENERAL
PAINLEVEL_OUTOF10: 0

## 2020-03-17 NOTE — CARE COORDINATION
DISCHARGE PLANNING EVALUATION: OP/OBSERVATION        3/17/20, 7:51 AM EDT    Josephine Nyhan Oloughlin       Admitted from: ER 3/16/2020/ 0318   Location: [address] [place] [address] Reason for admit: Dizziness [R42]   Admit order signed?: yes    Procedure: No    Pertinent Info/Orders/Treatment Plan: Presents for eval of abd pain, dark stool and syncope. Hypotensive in ER. Telemetry. Consult Cardiology, Neurology and GI. H & H q 6 hrs. CT adrenals ordered. Carotid US, CT Lungs ordered. PCP: VINH Carnes CNP    Patient Goals/Plan/Treatment Preferences: Met with pt today. He is anxious to go home as he has had many tests performed and he is uninsured and planning to get back to work tomorrow. He drives city bus. He is uninsured. This CM phoned [employer] to request screening. He is from home alone and is self sufficient. He has no services or DME. He has a PCP and he denies issues getting meds or obtaining basic needs. Transportation/Food Security/Housekeeping Addressed:  No issues identified. 3/17/20, 2:20 PM EDT    Patient goals/plan/ treatment preferences discussed by  and . Patient goals/plan/ treatment preferences reviewed with patient/ family. Patient/ family verbalize understanding of discharge plan and are in agreement with goal/plan/treatment preferences. Understanding was demonstrated using the teach back method. AVS provided by RN at time of discharge, which includes all necessary medical information pertaining to the patients current course of illness, treatment, post-discharge goals of care, and treatment preferences. Potential discharge home later today. No needs voiced.

## 2020-03-17 NOTE — PROGRESS NOTES
Gastroenterology Progress Note:     Patient Name:  Ney Parson   MRN: [id number]  [id number]  YOB: 1960  Admit Date: 3/16/2020  9:59 AM  Primary Care Physician: VINH Moreno - CNP   [id number]     Patient seen and examined. 24 hours events and chart reviewed. Subjective: Patient sitting up in the chair. Denies abdominal pain, nausea, and vomiting. Stools are dark brown. Hgb 10.2    Objective:  BP (!) 117/58   Pulse 74   Temp 98.4 °F (36.9 °C) (Oral)   Resp 16   Ht 5' 9\" (1.753 m)   Wt 256 lb 1.6 oz (116.2 kg)   SpO2 100%   BMI 37.82 kg/m²     Physical Exam:    General:  Nourished in no distress  HEENT: Atraumatic, normocephalic. Moist oral mucous membranes. Neck: Supple without adenopathy, JVD, thyromegaly or masses. Trachea midline. CV: Heart RRR, no murmurs, rubs, gallops. Resp: Even, easy without cough or accessory use. Lungs clear to ascultation bilaterally. Abd: Round, soft, nontender. No hepatosplenomegaly or mass present. Active bowel sounds heard. No distention noted. Ext:  Without cyanosis, clubbing, edema. Skin: Pink, warm, dry  Neuro:  Alert, oriented x 3 with no obvious deficits.   Rectal: deferred    Labs:   CBC:   Lab Results   Component Value Date    WBC 7.3 03/17/2020    HGB 10.2 03/17/2020    HCT 30.4 03/17/2020    MCV 95.7 03/17/2020     03/17/2020     BMP:   Lab Results   Component Value Date     03/17/2020    K 4.2 03/17/2020     03/17/2020    CO2 20 03/17/2020    BUN 30 03/17/2020    CREATININE 0.9 03/17/2020    CALCIUM 7.7 03/17/2020     PT/INR:   Lab Results   Component Value Date    INR 1.14 03/17/2020     Lipids:   Lab Results   Component Value Date    ALKPHOS 54 03/16/2020    ALT 18 03/16/2020    AST 18 03/16/2020    BILITOT 0.4 03/16/2020    BILIDIR <0.2 03/16/2020    LABALBU 3.8 03/16/2020    LIPASE 36.8 03/16/2020     Significant Diagnostic Studies:   CT adrenals 03/17/20      Impression   The previously described left collaboration with Dr. Neldon Sever  Electronically signed by VINH Beck CNP on 3/17/2020 at 12:43 PM    [facility]

## 2020-03-17 NOTE — PROGRESS NOTES
Clarified discharge order of ASA with recent GI bleed. Dr Amaya Fraire okay with patient taking ASA as hbg is stable and he has a repeat CBC.

## 2020-03-17 NOTE — PLAN OF CARE
Problem: Cardiac:  Goal: Ability to maintain vital signs within normal range will improve  Description: Ability to maintain vital signs within normal range will improve  Outcome: Ongoing  Note:   Vitals:    03/16/20 1930   BP: 120/86   Pulse: 87   Resp: 17   Temp: 97.9 °F (36.6 °C)   SpO2: 100%          Problem: Discharge Planning:  Goal: Discharged to appropriate level of care  Description: Discharged to appropriate level of care  Outcome: Ongoing  Note: Discharge plan reviewed with patient. Patient continues to actively participate in current discharge plan. Case management/ following patient. Care plan reviewed with patient. Patient verbalized understanding of the care plan and contribute to goal setting.

## 2020-03-18 NOTE — CONSULTS
[address] [place] [address]                                  CONSULTATION    PATIENT NAME: Sly Hollis                 :        1960  MED REC NO:   [id number]                           ROOM:       0022  ACCOUNT NO:   [de-identified]                           ADMIT DATE: 2020  PROVIDER:     Kaykay Tejeda MD    CONSULT DATE:  2020    CARDIOLOGY CONSULTATION NOTE    INDICATION FOR CONSULTATION:  Loss of consciousness. HISTORY OF PRESENT ILLNESS:  This is a 79-year-old male patient with  past medical history of hypertension. The patient denies having any  cardiac disease. He denies any significant past surgical history. He  reports taking medications for hypertension. The patient reported  having dark brown stools for a few days. He had intermittent dizziness. Last night, the patient had an episode of sudden-onset loss of  consciousness. He denies having chest pain prior to that. The patient  fell on the floor. He thinks he was unconscious for about one minute. He remains asymptomatic at this time. While in the hospital, the  patient was monitored on telemetry. He continued to have normal sinus  rhythm. Apparently, he was evaluated by Gastroenterology and Neurology. Per Neurology evaluation, a brain MRI was ordered and revealed no acute  findings. Bilateral carotid ultrasound revealed mild plaque formation,  no significant stenosis. CT scan of the head revealed no acute  intracranial hemorrhage or mass effect. Sodium 133, potassium 4.2,  creatinine 0.9. Hemoglobin was 9.6 today, 11.5 on admission. Platelets  918. Troponin less than 0.01. Cardiology was consulted for evaluation  of syncope. PAST MEDICAL HISTORY:  As listed above in history of present illness. PAST SURGICAL HISTORY:  As listed above in history of present illness. ALLERGIES:  No known drug allergies.     HOME MEDICATIONS:  Per

## 2020-03-18 NOTE — DISCHARGE SUMMARY
(A) NEGATIVE    Specific Gravity, Urine > 1.030 (A) 1.002 - 1.03    Blood, Urine NEGATIVE NEGATIVE    pH, UA 5.0 5.0 - 9.0    Protein, UA NEGATIVE NEGATIVE    Urobilinogen, Urine 0.2 0.0 - 1.0 eu/dl    Nitrite, Urine NEGATIVE NEGATIVE    Leukocyte Esterase, Urine TRACE (A) NEGATIVE    Color, UA YELLOW STRAW-YELL    Character, Urine CLEAR CLEAR-SL C    RBC, UA 0-2 0-2/hpf /hpf    WBC, UA 0-2 0-4/hpf /hpf    Epithelial Cells, UA 0-2 3-5/hpf /hpf    Bacteria, UA NONE SEEN FEW/NONE S /hpf    Casts UA NONE SEEN NONE SEEN /lpf    Crystals, UA NONE SEEN NONE SEEN    Renal Epithelial, UA NONE SEEN NONE SEEN    Yeast, UA NONE SEEN NONE SEEN    CASTS 2 NONE SEEN NONE SEEN /lpf    MISCELLANEOUS 2 NONE SEEN    Blood occult stool screen #1    Collection Time: 03/16/20 10:30 AM   Result Value Ref Range    OCCULT BLOOD FECAL see below    TYPE AND SCREEN    Collection Time: 03/16/20 11:00 AM   Result Value Ref Range    ABO A     Rh Factor POS     Antibody Screen NEG    EKG 12 Lead    Collection Time: 03/16/20  3:19 PM   Result Value Ref Range    Ventricular Rate 76 BPM    Atrial Rate 76 BPM    P-R Interval 168 ms    QRS Duration 78 ms    Q-T Interval 398 ms    QTc Calculation (Bazett) 447 ms    P Axis 39 degrees    R Axis 37 degrees    T Axis 41 degrees   Hemoglobin and hematocrit, blood    Collection Time: 03/16/20  7:24 PM   Result Value Ref Range    Hemoglobin 11.5 (L) 14.0 - 18.0 gm/dl    Hematocrit 34.0 (L) 42.0 - 52.0 %   Troponin    Collection Time: 03/16/20  7:24 PM   Result Value Ref Range    Troponin T < 0.010 ng/ml   Troponin    Collection Time: 03/16/20 10:58 PM   Result Value Ref Range    Troponin T < 0.010 ng/ml   Hemoglobin and hematocrit, blood    Collection Time: 03/17/20  1:41 AM   Result Value Ref Range    Hemoglobin 9.7 (L) 14.0 - 18.0 gm/dl    Hematocrit 29.1 (L) 42.0 - 52.0 %   Protime-INR    Collection Time: 03/17/20  4:30 AM   Result Value Ref Range    INR 1.14 (H) 0.85 - 1.13   CBC    Collection Time: 03/17/20  4:30 AM   Result Value Ref Range    WBC 7.3 4.8 - 10.8 thou/mm3    RBC 3.02 (L) 4.70 - 6.10 mill/mm3    Hemoglobin 9.6 (L) 14.0 - 18.0 gm/dl    Hematocrit 28.9 (L) 42.0 - 52.0 %    MCV 95.7 (H) 80.0 - 94.0 fL    MCH 31.8 26.0 - 33.0 pg    MCHC 33.2 32.2 - 35.5 gm/dl    RDW-CV 13.2 11.5 - 14.5 %    RDW-SD 46.0 (H) 35.0 - 45.0 fL    Platelets 647 074 - 293 thou/mm3    MPV 11.8 9.4 - 12.4 fL   Basic Metabolic Panel    Collection Time: 03/17/20  4:30 AM   Result Value Ref Range    Sodium 133 (L) 135 - 145 meq/L    Potassium 4.2 3.5 - 5.2 meq/L    Chloride 104 98 - 111 meq/L    CO2 20 (L) 23 - 33 meq/L    Glucose 117 (H) 70 - 108 mg/dL    BUN 30 (H) 7 - 22 mg/dL    CREATININE 0.9 0.4 - 1.2 mg/dL    Calcium 7.7 (L) 8.5 - 10.5 mg/dL   Anion Gap    Collection Time: 03/17/20  4:30 AM   Result Value Ref Range    Anion Gap 9.0 8.0 - 16.0 meq/L   Glomerular Filtration Rate, Estimated    Collection Time: 03/17/20  4:30 AM   Result Value Ref Range    Est, Glom Filt Rate 86 (A) ml/min/1.73m2   EKG 12 Lead    Collection Time: 03/17/20  5:24 AM   Result Value Ref Range    Ventricular Rate 68 BPM    Atrial Rate 68 BPM    P-R Interval 188 ms    QRS Duration 78 ms    Q-T Interval 410 ms    QTc Calculation (Bazett) 435 ms    P Axis 65 degrees    R Axis 64 degrees    T Axis 63 degrees   Hemoglobin and hematocrit, blood    Collection Time: 03/17/20  8:15 AM   Result Value Ref Range    Hemoglobin 10.2 (L) 14.0 - 18.0 gm/dl    Hematocrit 30.4 (L) 42.0 - 52.0 %   Hemoglobin and hematocrit, blood    Collection Time: 03/17/20  3:07 PM   Result Value Ref Range    Hemoglobin 9.6 (L) 14.0 - 18.0 gm/dl    Hematocrit 28.9 (L) 42.0 - 52.0 %        Microbiology:    Blood culture #1: No results found for: BC    Blood culture #2:No results found for: BLOODCULT2    Organism:  No results found for: LABGRAM    MRSA culture only:No results found for: [address] [place] [address]    Urine culture: No results found for: LABURIN  No results found for: [facility]     Respiratory obtained through the abdomen before and following the administration of intravenous contrast. 60 seconds and 15 minute delayed images were obtained. Sagittal and coronal reformatted images were also provided for interpretation. All CT scans at this facility use dose modulation, iterative reconstruction, and/or weight-based dosing when appropriate to reduce radiation dose to as low as reasonably achievable. FINDINGS: The visualized lung bases are clear. There is no pleural effusion or pneumothorax. The liver and spleen demonstrate smooth contours and are normal in size. The gallbladder contains some sludge. There is no gallbladder wall thickening or edema. There is redemonstration of a left adrenal gland nodule which measures approximately 1.3 x 0.7 cm. The lesion has a density of approximately -16 Hounsfield units on the noncontrast examination which is consistent with a lipid rich adenoma. The kidneys demonstrate symmetric size, shape and degree of enhancement. There is no evidence of hydronephrosis. No suspicious masses. No renal stones. No evidence of a small or large bowel obstruction. Visualized portions of the aorta demonstrate a normal caliber with no aneurysmal dilatation. The previously described left adrenal gland nodule has a density of -16 Hounsfield units on the noncontrast images, a finding which is consistent with a lipid rich adenoma. **This report has been created using voice recognition software. It may contain minor errors which are inherent in voice recognition technology. ** Final report electronically signed by Dr Bella Fong on 3/17/2020 9:51 AM    Ct Lung Screening (annual)    Result Date: 3/17/2020  NONCONTRAST SCREENING CT CHEST: HISTORY: History of smoking. 30 pack year history of smoking.  TECHNIQUE: 1 mm axial imaging of the chest and upper abdomen without IV contrast. All CT scans at this facility use dose modulation, iterative reconstruction, and/or weight based dosing when []CV surg    [] Palliative  [] Hospice [] Pain management   []    []TCU   [] PT/OT  OTHERS:-     Disposition: home  Condition at Discharge: Stable    Discharge Medications:      [facility]   Home Medication Instructions [id number]    Printed on:03/18/20 0020   Medication Information                      aspirin 81 MG tablet  Take 81 mg by mouth daily             lisinopril-hydrochlorothiazide (PRINZIDE;ZESTORETIC) 10-12.5 MG per tablet  Take 1 tablet by mouth daily.              pantoprazole (PROTONIX) 40 MG tablet  Take 1 tablet by mouth every morning (before breakfast)             sucralfate (CARAFATE) 1 GM tablet  Take 1 tablet by mouth 4 times daily                      Time Spent:- 40 minutes    Electronically signed by Frankie Rivera MD on 3/18/2020 at 9:26 AM  Discharging Hospitalist

## 2020-03-19 LAB
ALDOSTERONE: 7.6 NG/DL
RENIN ACTIVITY: 16.5 NG/ML/HR

## 2020-03-20 LAB — METANEPHRINES PLASMA: NORMAL

## 2020-03-22 LAB — CORTISOL (UR), FREE: NORMAL

## 2025-03-21 ENCOUNTER — TRANSCRIBE ORDERS (OUTPATIENT)
Dept: ADMINISTRATIVE | Age: 65
End: 2025-03-21

## 2025-03-21 DIAGNOSIS — L03.116 CELLULITIS OF LEFT LOWER LIMB: Primary | ICD-10-CM
